# Patient Record
Sex: FEMALE | Race: WHITE | NOT HISPANIC OR LATINO | Employment: OTHER | URBAN - METROPOLITAN AREA
[De-identification: names, ages, dates, MRNs, and addresses within clinical notes are randomized per-mention and may not be internally consistent; named-entity substitution may affect disease eponyms.]

---

## 2017-04-26 ENCOUNTER — OFFICE VISIT (OUTPATIENT)
Dept: OPHTHALMOLOGY | Facility: CLINIC | Age: 77
End: 2017-04-26
Payer: MEDICARE

## 2017-04-26 DIAGNOSIS — H18.519 FUCHS' CORNEAL DYSTROPHY: Primary | ICD-10-CM

## 2017-04-26 DIAGNOSIS — H04.229: ICD-10-CM

## 2017-04-26 PROCEDURE — 99212 OFFICE O/P EST SF 10 MIN: CPT | Mod: PBBFAC | Performed by: OPHTHALMOLOGY

## 2017-04-26 PROCEDURE — 99999 PR PBB SHADOW E&M-EST. PATIENT-LVL II: CPT | Mod: PBBFAC,,, | Performed by: OPHTHALMOLOGY

## 2017-04-26 PROCEDURE — 92014 COMPRE OPH EXAM EST PT 1/>: CPT | Mod: S$PBB,,, | Performed by: OPHTHALMOLOGY

## 2017-04-26 RX ORDER — ERYTHROMYCIN 5 MG/G
OINTMENT OPHTHALMIC
COMMUNITY
Start: 2017-04-19

## 2017-04-26 NOTE — PROGRESS NOTES
"HPI     DLS 4/20/2016      s/p Phaco/DSEK OS 10/24/13   S/p phaco /dsek OD 4/3/14       Pt states she has been battling blocked tear ducts. She has seen Dr. Quinn and Dr. Robison.  They have suggested procedures, surgery and   botox for the "twitching".    Doing well with her corneal surgery, wants to keep it that way.             Eye Meds  FML bid OU  Systane prn OU       Last edited by Krista Grier on 4/26/2017  9:21 AM.         Assessment /Plan     For exam results, see Encounter Report.    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 Signs and symptoms of graft rejection reviewed.  3-4yrs po    Seeing Elie Alejandre for lids and lacrimal.                   "

## 2017-04-26 NOTE — MR AVS SNAPSHOT
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     Registered nurses in the [facility] Center provide: appointment scheduling, clinical advisement, health education, and other advisory services.  Call: [phone] (toll free)               Medications                Verify that the below list of medications is an accurate representation of the medications you are currently taking.  If none reported, the list may be blank. If incorrect, please contact your healthcare provider. Carry this list with you in case of emergency.           Current Medications     erythromycin (ROMYCIN) ophthalmic ointment     fluorometholone 0.1% (FML) 0.1 % DrpS Place 1 drop into both eyes 2 (two) times daily.     MULTIVITAMIN W-MINERALS/LUTEIN (CENTRUM SILVER ORAL) Take by mouth.    ondansetron (ZOFRAN-ODT) 4 MG TbDL Take 8 mg by mouth once.    ramipril (ALTACE) 2.5 MG capsule Take 2.5 mg by mouth once daily.    zolpidem (AMBIEN) 5 MG Tab Take 5 mg by mouth nightly as needed.           Clinical Reference Information           Allergies as of 2017     Pertussis Vaccines    Demerol [Meperidine]    Epinephrine      Immunizations Administered on Date of Encounter - 2017     None      [facility] Sign-Up     Activating your [facility] account is as easy  as 1-2-3!     1) Visit [email or web address], select Sign Up Now, enter this activation code and your date of birth, then select Next.  [email or web address]  Expires: 6/10/2017  9:53 AM      2) Create a username and password to use when you visit [facility] in the future and select a security question in case you lose your password and select Next.    3) Enter your e-mail address and click Sign Up!    Additional Information  If you have questions, please e-mail [email or web address] or call [phone] to talk to our [email or web address] staff. Remember, [facility] is NOT to be used for urgent needs. For medical emergencies, dial 911.         Language Assistance Services     ATTENTION: Language assistance services are available, free of charge. Please call [phone].      ATENCIÓN: Si habla español, tiene a suggs disposición servicios gratuitos de asistencia lingüística. Llame al [phone].     [facility] Ý: N?u b?n nói Ti?ng Vi?t, có các d?ch v? h? tr? ngôn ng? mi?n phí dành cho b?n. G?i s? [phone].         [facility] complies with applicable Federal civil rights laws and does not discriminate on the basis of race, color, national origin, age, disability, or sex.

## 2017-06-06 RX ORDER — FLUOROMETHOLONE 1 MG/ML
SUSPENSION/ DROPS OPHTHALMIC
Qty: 10 ML | Refills: 0 | Status: SHIPPED | OUTPATIENT
Start: 2017-06-06 | End: 2018-07-24 | Stop reason: SDUPTHER

## 2017-08-30 ENCOUNTER — TELEPHONE (OUTPATIENT)
Dept: OPHTHALMOLOGY | Facility: CLINIC | Age: 77
End: 2017-08-30

## 2017-08-30 NOTE — TELEPHONE ENCOUNTER
----- Message from Debra Galvez sent at 8/29/2017  3:18 PM CDT -----  Contact: Pt      ----- Message -----  From: Elena Olivas  Sent: 8/28/2017   4:27 PM  To: [facility]OR[facility] Staff    Pt would like to speak with the nurse about some medicines that were prescribed for her in [place]. She can be reached at [phone]

## 2018-05-02 ENCOUNTER — OFFICE VISIT (OUTPATIENT)
Dept: OPHTHALMOLOGY | Facility: CLINIC | Age: 78
End: 2018-05-02
Payer: MEDICARE

## 2018-05-02 DIAGNOSIS — H18.519 FUCHS' CORNEAL DYSTROPHY: Primary | ICD-10-CM

## 2018-05-02 DIAGNOSIS — Z94.7 STATUS POST CORNEAL TRANSPLANT: ICD-10-CM

## 2018-05-02 PROCEDURE — 99212 OFFICE O/P EST SF 10 MIN: CPT | Mod: PBBFAC | Performed by: OPHTHALMOLOGY

## 2018-05-02 PROCEDURE — 99999 PR PBB SHADOW E&M-EST. PATIENT-LVL II: CPT | Mod: PBBFAC,,, | Performed by: OPHTHALMOLOGY

## 2018-05-02 PROCEDURE — 92014 COMPRE OPH EXAM EST PT 1/>: CPT | Mod: S$PBB,,, | Performed by: OPHTHALMOLOGY

## 2018-05-02 NOTE — PROGRESS NOTES
HPI     S/P Phaco/DSEK OS 10/24/13   S/P Phaco/DSEK OD 4/3/14     Pt states she is doing well, no problems or complaints.   MEDS:   Systane OU PRN   FML BID OU        Last edited by Violet Perez, PCT on 5/2/2018  8:36 AM. (History)            Assessment /Plan     For exam results, see Encounter Report.    Fuchs' corneal dystrophy    Status post corneal transplant      DSEK graft attached and clear. Signs and symptoms of graft rejection reviewed.  4 yrs po

## 2018-09-12 RX ORDER — FLUOROMETHOLONE 1 MG/ML
SUSPENSION/ DROPS OPHTHALMIC
Qty: 10 ML | Refills: 4 | Status: SHIPPED | OUTPATIENT
Start: 2018-09-12 | End: 2019-09-07 | Stop reason: SDUPTHER

## 2019-05-08 ENCOUNTER — OFFICE VISIT (OUTPATIENT)
Dept: OPHTHALMOLOGY | Facility: CLINIC | Age: 79
End: 2019-05-08
Payer: MEDICARE

## 2019-05-08 DIAGNOSIS — Z94.7 STATUS POST CORNEAL TRANSPLANT: ICD-10-CM

## 2019-05-08 DIAGNOSIS — H18.519 FUCHS' CORNEAL DYSTROPHY: Primary | ICD-10-CM

## 2019-05-08 PROCEDURE — 99999 PR PBB SHADOW E&M-EST. PATIENT-LVL II: CPT | Mod: PBBFAC,,, | Performed by: OPHTHALMOLOGY

## 2019-05-08 PROCEDURE — 92014 COMPRE OPH EXAM EST PT 1/>: CPT | Mod: S$PBB,,, | Performed by: OPHTHALMOLOGY

## 2019-05-08 PROCEDURE — 92014 PR EYE EXAM, EST PATIENT,COMPREHESV: ICD-10-PCS | Mod: S$PBB,,, | Performed by: OPHTHALMOLOGY

## 2019-05-08 PROCEDURE — 99999 PR PBB SHADOW E&M-EST. PATIENT-LVL II: ICD-10-PCS | Mod: PBBFAC,,, | Performed by: OPHTHALMOLOGY

## 2019-05-08 PROCEDURE — 99212 OFFICE O/P EST SF 10 MIN: CPT | Mod: PBBFAC | Performed by: OPHTHALMOLOGY

## 2019-05-08 NOTE — PROGRESS NOTES
HPI     DLS: 5/02/18    Pt here for yearly DSEK check;  S/P Phaco/DSEK OS 10/24/13   S/P Phaco/DSEK OD 4/3/14   Pt states she had two botox injections since last visit with Dr. Ruiz.     Meds:   Systane OU PRN  FML BID OU   EES MICHOACANO QHS  Genteal QHS OS    Last edited by Sunni Cerda on 5/8/2019  8:38 AM. (History)            Assessment /Plan     For exam results, see Encounter Report.    Fuchs' corneal dystrophy    Status post corneal transplant      Both DSEK grafts attached and clear. Signs and symptoms of graft rejection reviewed.  Doing well, Continue current treatment/medications

## 2019-09-10 RX ORDER — FLUOROMETHOLONE 1 MG/ML
SUSPENSION/ DROPS OPHTHALMIC
Qty: 10 ML | Refills: 0 | Status: SHIPPED | OUTPATIENT
Start: 2019-09-10 | End: 2020-07-08

## 2020-05-20 ENCOUNTER — OFFICE VISIT (OUTPATIENT)
Dept: OPHTHALMOLOGY | Facility: CLINIC | Age: 80
End: 2020-05-20
Payer: MEDICARE

## 2020-05-20 DIAGNOSIS — H18.519 FUCHS' CORNEAL DYSTROPHY: Primary | ICD-10-CM

## 2020-05-20 DIAGNOSIS — Z94.7 STATUS POST CORNEAL TRANSPLANT: ICD-10-CM

## 2020-05-20 PROCEDURE — 92014 PR EYE EXAM, EST PATIENT,COMPREHESV: ICD-10-PCS | Mod: S$PBB,,, | Performed by: OPHTHALMOLOGY

## 2020-05-20 PROCEDURE — 99999 PR PBB SHADOW E&M-EST. PATIENT-LVL II: ICD-10-PCS | Mod: PBBFAC,,, | Performed by: OPHTHALMOLOGY

## 2020-05-20 PROCEDURE — 99999 PR PBB SHADOW E&M-EST. PATIENT-LVL II: CPT | Mod: PBBFAC,,, | Performed by: OPHTHALMOLOGY

## 2020-05-20 PROCEDURE — 92014 COMPRE OPH EXAM EST PT 1/>: CPT | Mod: S$PBB,,, | Performed by: OPHTHALMOLOGY

## 2020-05-20 PROCEDURE — 99212 OFFICE O/P EST SF 10 MIN: CPT | Mod: PBBFAC | Performed by: OPHTHALMOLOGY

## 2020-05-20 RX ORDER — DIAZEPAM 5 MG/1
1 TABLET ORAL NIGHTLY
COMMUNITY
Start: 2020-04-21

## 2020-05-20 NOTE — PROGRESS NOTES
HPI     S/P Phaco/DSEK OS 10/24/13   S/P Phaco/DSEK OD 4/3/14     Meds:  FML bid   Systane OU PRN   FML BID OU   EES MICHOACANO QHS   Genteal QHS OS     Pt here for yearly DSEK f/u. Pt states no sig changes in [place] since last   visit. Doing well. No other ocular complaints.     Last edited by Jean-Pierre Reardon MD on 5/20/2020  8:35 AM. (History)            Assessment /Plan     For exam results, see Encounter Report.    Fuchs' corneal dystrophy    Status post corneal transplant        Both DSEK grafts attached and clear. Signs and symptoms of graft rejection reviewed.  Doing well, Continue current treatment/medications  7 years now with excellent result.

## 2020-05-29 ENCOUNTER — TELEPHONE (OUTPATIENT)
Dept: OPHTHALMOLOGY | Facility: CLINIC | Age: 80
End: 2020-05-29

## 2020-05-29 NOTE — TELEPHONE ENCOUNTER
----- Message from Lia Stuart sent at 5/29/2020  8:47 AM CDT -----  Contact: Pt  Pt had an appt last week, but is calling because she feels she didn't ask all the questions she needed to. She would like to speak to staff.    Pts#[phone]

## 2020-05-29 NOTE — TELEPHONE ENCOUNTER
----- Message from Lia Stuart sent at 5/29/2020  9:40 AM CDT -----  Contact: Pt  Pt calling back in reference to missed call from Violet    Pts# [phone]

## 2021-03-18 ENCOUNTER — TELEPHONE (OUTPATIENT)
Dept: OPHTHALMOLOGY | Facility: CLINIC | Age: 81
End: 2021-03-18

## 2021-03-19 ENCOUNTER — TELEPHONE (OUTPATIENT)
Dept: OPHTHALMOLOGY | Facility: CLINIC | Age: 81
End: 2021-03-19

## 2021-08-26 ENCOUNTER — TELEPHONE (OUTPATIENT)
Dept: OBSTETRICS AND GYNECOLOGY | Facility: CLINIC | Age: 81
End: 2021-08-26

## 2021-08-26 DIAGNOSIS — Z12.31 SCREENING MAMMOGRAM FOR HIGH-RISK PATIENT: Primary | ICD-10-CM

## 2022-03-30 ENCOUNTER — TELEPHONE (OUTPATIENT)
Dept: OPHTHALMOLOGY | Facility: CLINIC | Age: 82
End: 2022-03-30
Payer: MEDICARE

## 2022-03-30 NOTE — TELEPHONE ENCOUNTER
----- Message from Roxy Cuello sent at 3/30/2022 11:13 AM CDT -----  Regarding: refill  Patient would like a refill of the following medication:  fluorometholone 0.1% (FML) 0.1 % [facility] - [place], [place] - [address]  [address] [place] [address]  Phone: [phone] Fax: [phone]        Ne  @   [phone]

## 2022-06-08 ENCOUNTER — TELEPHONE (OUTPATIENT)
Dept: OBSTETRICS AND GYNECOLOGY | Facility: CLINIC | Age: 82
End: 2022-06-08
Payer: MEDICARE

## 2022-06-08 NOTE — TELEPHONE ENCOUNTER
Notified patient that her last Mammo was last done on 8/2021 patient will call back to schedule appointment.    ----- Message from Bhumi Rushing sent at 6/8/2022 12:13 PM CDT -----  Contact: pt  Type: Needs Medical Advice         Who Called: pt  Best Call Back Number:[phone]  Additional Information: Requesting a call back regarding  pt is wondering when her last Mammo and Done Density Test was done that was ordered by Dr Back .  Pt said that the orders in the chart are external and she cant remember when it was done last.   Please Advise- Thank you

## 2023-02-28 ENCOUNTER — TELEPHONE (OUTPATIENT)
Dept: OBSTETRICS AND GYNECOLOGY | Facility: CLINIC | Age: 83
End: 2023-02-28
Payer: MEDICARE

## 2023-02-28 NOTE — TELEPHONE ENCOUNTER
----- Message from Miriam Max LPN sent at 2/28/2023  9:25 AM CST -----  Contact: PT    ----- Message -----  From: Elvia Pichardo  Sent: 2/28/2023   9:09 AM CST  To: Wong SALDANA Staff    Type: Needs Medical Advice    Who Called: Ne/ PT  Best Call Back Number: [phone]  Additional  Information: PT asking to get orders for Mammogram & US for bones please fax orders to [facility]  Please Advise- Thank you

## 2023-02-28 NOTE — TELEPHONE ENCOUNTER
Called patient to schedule annual visit and inform her that she can get order for mammo and bone density at appointment

## 2023-05-02 ENCOUNTER — TELEPHONE (OUTPATIENT)
Dept: OPHTHALMOLOGY | Facility: CLINIC | Age: 83
End: 2023-05-02
Payer: MEDICARE

## 2023-05-02 NOTE — TELEPHONE ENCOUNTER
----- Message from Sandra Sanford sent at 5/2/2023  3:43 PM CDT -----  Contact: pt @ [phone]  Ne Howard calling regarding Patient Advice (message) for #pt returning call from [place], asking for call back

## 2023-05-02 NOTE — TELEPHONE ENCOUNTER
----- Message from Kristie Hameed sent at 5/2/2023  3:27 PM CDT -----    ----- Message -----  From: Daxa Krishnan  Sent: 5/1/2023   1:36 PM CDT  To: Kristie Hameed      ----- Message -----  From: Jasiel Campo  Sent: 5/1/2023  10:08 AM CDT  To: [facility] Staff    Consult/Advisory    Name Of Caller:Ne      Contact Preference:[phone]    Nature of call: Pt called regarding fluorometholone 0.1% (FML) 0.1 % DrpS.  She stated the rx was covered when it was under the doctor name now that is under her local [facility] name she has to pay a cost. She asked if the dr can prescribe the drops for her so it will be no fee. Please call pt to further assist.

## 2023-05-03 ENCOUNTER — TELEPHONE (OUTPATIENT)
Dept: OPHTHALMOLOGY | Facility: CLINIC | Age: 83
End: 2023-05-03
Payer: MEDICARE